# Patient Record
Sex: MALE | Race: WHITE | NOT HISPANIC OR LATINO | URBAN - NONMETROPOLITAN AREA
[De-identification: names, ages, dates, MRNs, and addresses within clinical notes are randomized per-mention and may not be internally consistent; named-entity substitution may affect disease eponyms.]

---

## 2019-07-11 ENCOUNTER — IMPORTED ENCOUNTER (OUTPATIENT)
Dept: URBAN - NONMETROPOLITAN AREA CLINIC 1 | Facility: CLINIC | Age: 64
End: 2019-07-11

## 2019-07-11 PROBLEM — E11.9: Noted: 2019-07-11

## 2019-07-11 PROBLEM — E11.3291: Noted: 2019-07-11

## 2019-07-11 PROBLEM — H49.21: Noted: 2019-07-11

## 2019-07-11 PROBLEM — H25.13: Noted: 2019-07-11

## 2019-07-11 PROCEDURE — 99203 OFFICE O/P NEW LOW 30 MIN: CPT

## 2019-07-11 NOTE — PATIENT DISCUSSION
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 - Discussed diagnosis in detail with patient - Onset about 1 week ago with double vision- Patient has had a CT scan with normal findings- Dilated exam shows OD  cotton wool spot with splinter heme and dot blots  S/T arcade and OS shows no diabetic retinopathy - Would like to get blood sugar and blood pressure under control - Continue to monitor IDDM (1999) with NPDR OD - Discussed diagnosis in detail with patient- Stressed importance of good blood sugar control- Recommend no soda’s- Patient reports last A1C was 7.2 and last blood sugar was 201- Dilated exam today shows OD cotton wool spot with splinter heme and dot blots  S/T arcade and OS shows no diabetic retinopathy - Would like to rule out BRVO ? - Letter to Dr. Kaycee [place] [address] [address] [address] at next visit ***only if patient does not have double vision complaint - Continue to [facility]- Discussed diagnosis in detail with patient- Discussed signs and symptoms of progression- Discussed UV protection- No treatment needed at this time - Continue to monitor

## 2019-08-09 ENCOUNTER — IMPORTED ENCOUNTER (OUTPATIENT)
Dept: URBAN - NONMETROPOLITAN AREA CLINIC 1 | Facility: CLINIC | Age: 64
End: 2019-08-09

## 2019-08-09 PROCEDURE — 92250 FUNDUS PHOTOGRAPHY W/I&R: CPT

## 2019-08-09 PROCEDURE — 99213 OFFICE O/P EST LOW 20 MIN: CPT

## 2019-08-09 NOTE — PATIENT DISCUSSION
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 when he was younger- Patient has had a CT scan with normal findings since this was normal I feel as though patient does not need to see Neuro-Ophthalmologist at this time. Patient agrees with plan and states he doesn't want referral at this time. - Improved adduction OD slight diplopia still noted with central/inferior gaze. Diplopia increases with right gaze. Discussed with patient that it could take 4-6 months to resolve. - Dilated exam at last exam: OD  cotton wool spot with splinter heme and dot blots  S/T arcade and OS shows no diabetic retinopathy - Optos today: cotton wool spot resolved small resovling heme in superior/temporal arcade. Improved from previous.  - Would like to get blood sugar and blood pressure under control - Patient has follow up with Dr. Tramaine Pillai at the end of this month will send updated letter- Continue to monitor IDDM (1999) with NPDR OD - improving OD- Discussed diagnosis in detail with patient- Stressed importance of good blood sugar control- Recommend no soda’s- Patient reports last A1C was 7.2 and last blood sugar was 201- Letter to Dr. Tramaine Pillai in [address] to [facility]- Discussed diagnosis in detail with patient- Discussed signs and symptoms of progression- Discussed UV protection- No treatment needed at this time - Continue to monitor

## 2019-12-10 ENCOUNTER — IMPORTED ENCOUNTER (OUTPATIENT)
Dept: URBAN - NONMETROPOLITAN AREA CLINIC 1 | Facility: CLINIC | Age: 64
End: 2019-12-10

## 2019-12-10 PROCEDURE — 99213 OFFICE O/P EST LOW 20 MIN: CPT

## 2019-12-10 PROCEDURE — 92250 FUNDUS PHOTOGRAPHY W/I&R: CPT

## 2019-12-10 NOTE — PATIENT DISCUSSION
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 when he was younger- Patient has had a CT scan with normal findings since this was normal I feel as though patient does not need to see Neuro-Ophthalmologist at this time. Patient agrees with plan and states he doesn't want referral at this time. - Dilated exam at last exam: OD  cotton wool spot with splinter heme and dot blots  S/T arcade has resolved on todays exam and OS shows no diabetic retinopathy - Optos today: cotton wool spot resolved small resovling heme in superior/temporal arcade.  Resolved - Would like to get blood sugar and blood pressure under control - Patient has follow up with Dr. Tramaine Pillai at the end of this month will send updated letter- Continue to monitor IDDM (1999) with NPDR OD - improving OD- Discussed diagnosis in detail with patient- Stressed importance of good blood sugar control- Recommend no soda’s- Patient reports last A1C was 7.2 and last blood sugar was 201- Letter to Dr. Tramaine Pillai in [address] to [facility]- Discussed diagnosis in detail with patient- Discussed signs and symptoms of progression- Discussed UV protection- No treatment needed at this time - Continue to monitor; Dr's Notes: Optos 12/10/19

## 2022-04-09 ASSESSMENT — TONOMETRY
OD_IOP_MMHG: 12
OD_IOP_MMHG: 16
OS_IOP_MMHG: 16
OS_IOP_MMHG: 16
OD_IOP_MMHG: 16
OS_IOP_MMHG: 13

## 2022-04-09 ASSESSMENT — VISUAL ACUITY
OD_CC: 20/20-1
OD_CC: 20/25-2
OD_CC: 20/20-
OS_CC: 20/25-1
OS_CC: 20/25-
OS_CC: 20/22

## 2022-09-29 ENCOUNTER — ESTABLISHED PATIENT (OUTPATIENT)
Dept: URBAN - NONMETROPOLITAN AREA CLINIC 1 | Facility: CLINIC | Age: 67
End: 2022-09-29

## 2022-09-29 DIAGNOSIS — H52.4: ICD-10-CM

## 2022-09-29 DIAGNOSIS — H49.21: ICD-10-CM

## 2022-09-29 PROCEDURE — 92015 DETERMINE REFRACTIVE STATE: CPT

## 2022-09-29 PROCEDURE — 92014 COMPRE OPH EXAM EST PT 1/>: CPT

## 2022-09-29 ASSESSMENT — VISUAL ACUITY
OS_SC: 20/20
OD_SC: 20/20

## 2022-09-29 ASSESSMENT — TONOMETRY
OS_IOP_MMHG: 16
OD_IOP_MMHG: 16

## 2022-09-29 NOTE — PATIENT DISCUSSION
Patient has history of intermittent double vision. However, today he states he was sick recently with high fevers and has noticed that since that time his double vision has come back and seems to be worse than before.

## 2022-09-29 NOTE — PATIENT DISCUSSION
Retinal exam findings communicated to Physician managing diabetes: Raymond Joya @ [address] [facility] in [place], [place].

## 2022-09-29 NOTE — PATIENT DISCUSSION
Patient defers referral to neuro-ophthalmologist at this time. If no improvement noted over the next 2-3 months, we need to reconsider this referral. Patient expressed understanding.

## 2022-09-29 NOTE — PATIENT DISCUSSION
Unknown if this is related to his blood sugar or maybe blood pressure, asked him to discuss further with PCP.

## 2022-12-06 ENCOUNTER — FOLLOW UP (OUTPATIENT)
Dept: URBAN - NONMETROPOLITAN AREA CLINIC 1 | Facility: CLINIC | Age: 67
End: 2022-12-06

## 2022-12-06 PROCEDURE — 99213 OFFICE O/P EST LOW 20 MIN: CPT

## 2022-12-06 ASSESSMENT — TONOMETRY
OD_IOP_MMHG: 16
OS_IOP_MMHG: 16

## 2022-12-06 ASSESSMENT — VISUAL ACUITY
OD_SC: 20/25-2
OS_SC: 20/30-1

## 2022-12-06 NOTE — PATIENT DISCUSSION
Retinal exam findings communicated to Physician managing diabetes:  Mandi Eddy MD @ [email or web address] [facility] in [place], [place].

## 2022-12-06 NOTE — PATIENT DISCUSSION
Patient has history of intermittent double vision. However, at his last visit he stated he had recently been sick with high fevers and had noticed that since that time his double vision had come back and seemed to be worse than before. After thorough examination at that time I noted decreased abduction OD, diplopic on central and right gaze. Unknown if this was related to his blood sugar or maybe blood pressure, asked him to discuss further with PCP if changed/worsened. Patient deferred referral to neuro-ophthalmologist at that time. If no improvement noted over the next 2-3 months, we need to reconsider this referral. Patient expressed understanding.

## 2024-03-19 ENCOUNTER — HOSPITAL ENCOUNTER (EMERGENCY)
Facility: HOSPITAL | Age: 69
Discharge: OTHER INSTITUTION | End: 2024-03-19
Payer: MEDICARE

## 2024-03-19 ENCOUNTER — ANESTHESIA (OUTPATIENT)
Dept: EMERGENCY MEDICINE | Facility: HOSPITAL | Age: 69
End: 2024-03-19
Payer: MEDICARE

## 2024-03-19 ENCOUNTER — ANESTHESIA EVENT (OUTPATIENT)
Dept: EMERGENCY MEDICINE | Facility: HOSPITAL | Age: 69
End: 2024-03-19
Payer: MEDICARE

## 2024-03-19 ENCOUNTER — HOSPITAL ENCOUNTER (EMERGENCY)
Facility: HOSPITAL | Age: 69
End: 2024-03-19
Payer: MEDICARE

## 2024-03-19 ENCOUNTER — APPOINTMENT (OUTPATIENT)
Dept: CARDIOLOGY | Facility: HOSPITAL | Age: 69
End: 2024-03-19
Payer: MEDICARE

## 2024-03-19 ENCOUNTER — APPOINTMENT (OUTPATIENT)
Dept: RADIOLOGY | Facility: HOSPITAL | Age: 69
End: 2024-03-19
Payer: MEDICARE

## 2024-03-19 VITALS
SYSTOLIC BLOOD PRESSURE: 177 MMHG | HEIGHT: 72 IN | HEART RATE: 99 BPM | OXYGEN SATURATION: 93 % | RESPIRATION RATE: 16 BRPM | DIASTOLIC BLOOD PRESSURE: 104 MMHG | BODY MASS INDEX: 32.49 KG/M2 | WEIGHT: 239.86 LBS

## 2024-03-19 VITALS — RESPIRATION RATE: 16 BRPM | HEART RATE: 100 BPM | OXYGEN SATURATION: 72 %

## 2024-03-19 DIAGNOSIS — R57.0 CARDIOGENIC SHOCK (MULTI): ICD-10-CM

## 2024-03-19 DIAGNOSIS — I21.3 ST ELEVATION MYOCARDIAL INFARCTION (STEMI), UNSPECIFIED ARTERY (MULTI): ICD-10-CM

## 2024-03-19 DIAGNOSIS — I21.3 STEMI (ST ELEVATION MYOCARDIAL INFARCTION) (MULTI): ICD-10-CM

## 2024-03-19 DIAGNOSIS — I46.9 CARDIAC ARREST (MULTI): Primary | ICD-10-CM

## 2024-03-19 DIAGNOSIS — I21.02 ST ELEVATION (STEMI) MYOCARDIAL INFARCTION INVOLVING LEFT ANTERIOR DESCENDING CORONARY ARTERY (MULTI): ICD-10-CM

## 2024-03-19 LAB
ALBUMIN SERPL BCP-MCNC: 4.5 G/DL (ref 3.4–5)
ALP SERPL-CCNC: 122 U/L (ref 33–136)
ALT SERPL W P-5'-P-CCNC: 28 U/L (ref 10–52)
ANION GAP BLDA CALCULATED.4IONS-SCNC: 17 MMO/L (ref 10–25)
ANION GAP BLDA CALCULATED.4IONS-SCNC: 19 MMO/L (ref 10–25)
ANION GAP BLDA CALCULATED.4IONS-SCNC: 23 MMO/L (ref 10–25)
ANION GAP SERPL CALC-SCNC: 18 MMOL/L (ref 10–20)
APPARATUS: ABNORMAL
ARTERIAL PATENCY WRIST A: POSITIVE
AST SERPL W P-5'-P-CCNC: 30 U/L (ref 9–39)
ATRIAL RATE: 116 BPM
BASE EXCESS BLDA CALC-SCNC: -10.4 MMOL/L (ref -2–3)
BASE EXCESS BLDA CALC-SCNC: -13.6 MMOL/L (ref -2–3)
BASE EXCESS BLDA CALC-SCNC: -15.1 MMOL/L (ref -2–3)
BASOPHILS # BLD AUTO: 0.25 X10*3/UL (ref 0–0.1)
BASOPHILS NFR BLD AUTO: 1 %
BILIRUB SERPL-MCNC: 0.4 MG/DL (ref 0–1.2)
BODY TEMPERATURE: ABNORMAL
BUN SERPL-MCNC: 24 MG/DL (ref 6–23)
CA-I BLDA-SCNC: 0.86 MMOL/L (ref 1.1–1.33)
CA-I BLDA-SCNC: 1.18 MMOL/L (ref 1.1–1.33)
CA-I BLDA-SCNC: 1.3 MMOL/L (ref 1.1–1.33)
CALCIUM SERPL-MCNC: 10.3 MG/DL (ref 8.6–10.3)
CARDIAC TROPONIN I PNL SERPL HS: 2169 NG/L (ref 0–20)
CHLORIDE BLDA-SCNC: 101 MMOL/L (ref 98–107)
CHLORIDE BLDA-SCNC: 81 MMOL/L (ref 98–107)
CHLORIDE BLDA-SCNC: 94 MMOL/L (ref 98–107)
CHLORIDE SERPL-SCNC: 99 MMOL/L (ref 98–107)
CO2 SERPL-SCNC: 22 MMOL/L (ref 21–32)
CREAT SERPL-MCNC: 1.57 MG/DL (ref 0.5–1.3)
EGFRCR SERPLBLD CKD-EPI 2021: 48 ML/MIN/1.73M*2
EOSINOPHIL # BLD AUTO: 0.45 X10*3/UL (ref 0–0.7)
EOSINOPHIL NFR BLD AUTO: 1.8 %
ERYTHROCYTE [DISTWIDTH] IN BLOOD BY AUTOMATED COUNT: 14.5 % (ref 11.5–14.5)
GLUCOSE BLD MANUAL STRIP-MCNC: 256 MG/DL (ref 74–99)
GLUCOSE BLD MANUAL STRIP-MCNC: 266 MG/DL (ref 74–99)
GLUCOSE BLDA-MCNC: 276 MG/DL (ref 74–99)
GLUCOSE BLDA-MCNC: 423 MG/DL (ref 74–99)
GLUCOSE BLDA-MCNC: 659 MG/DL (ref 74–99)
GLUCOSE SERPL-MCNC: 253 MG/DL (ref 74–99)
HCO3 BLDA-SCNC: 18.2 MMOL/L (ref 22–26)
HCO3 BLDA-SCNC: 18.5 MMOL/L (ref 22–26)
HCO3 BLDA-SCNC: 22.4 MMOL/L (ref 22–26)
HCT VFR BLD AUTO: 47.1 % (ref 41–52)
HCT VFR BLD EST: 34 % (ref 41–52)
HCT VFR BLD EST: 42 % (ref 41–52)
HCT VFR BLD EST: 43 % (ref 41–52)
HGB BLD-MCNC: 14.6 G/DL (ref 13.5–17.5)
HGB BLDA-MCNC: 11.2 G/DL (ref 13.5–17.5)
HGB BLDA-MCNC: 14.1 G/DL (ref 13.5–17.5)
HGB BLDA-MCNC: 14.3 G/DL (ref 13.5–17.5)
HOLD SPECIMEN: NORMAL
IMM GRANULOCYTES # BLD AUTO: 0.37 X10*3/UL (ref 0–0.7)
IMM GRANULOCYTES NFR BLD AUTO: 1.5 % (ref 0–0.9)
INHALED O2 CONCENTRATION: 100 %
LACTATE BLDA-SCNC: 6.6 MMOL/L (ref 0.4–2)
LACTATE BLDA-SCNC: 7.7 MMOL/L (ref 0.4–2)
LACTATE BLDA-SCNC: 7.9 MMOL/L (ref 0.4–2)
LYMPHOCYTES # BLD AUTO: 6.33 X10*3/UL (ref 1.2–4.8)
LYMPHOCYTES NFR BLD AUTO: 24.9 %
MCH RBC QN AUTO: 27.8 PG (ref 26–34)
MCHC RBC AUTO-ENTMCNC: 31 G/DL (ref 32–36)
MCV RBC AUTO: 90 FL (ref 80–100)
MONOCYTES # BLD AUTO: 2.78 X10*3/UL (ref 0.1–1)
MONOCYTES NFR BLD AUTO: 11 %
NEUTROPHILS # BLD AUTO: 15.2 X10*3/UL (ref 1.2–7.7)
NEUTROPHILS NFR BLD AUTO: 59.8 %
NRBC BLD-RTO: 0.2 /100 WBCS (ref 0–0)
OXYHGB MFR BLDA: 71.4 % (ref 94–98)
OXYHGB MFR BLDA: 83.4 % (ref 94–98)
OXYHGB MFR BLDA: 90.6 % (ref 94–98)
P AXIS: 18 DEGREES
P OFFSET: 187 MS
P ONSET: 127 MS
PCO2 BLDA: 69 MM HG (ref 38–42)
PCO2 BLDA: 84 MM HG (ref 38–42)
PCO2 BLDA: 95 MM HG (ref 38–42)
PH BLDA: 6.95 PH (ref 7.38–7.42)
PH BLDA: 6.98 PH (ref 7.38–7.42)
PH BLDA: 7.03 PH (ref 7.38–7.42)
PLATELET # BLD AUTO: 434 X10*3/UL (ref 150–450)
PO2 BLDA: 55 MM HG (ref 85–95)
PO2 BLDA: 60 MM HG (ref 85–95)
PO2 BLDA: 84 MM HG (ref 85–95)
POTASSIUM BLDA-SCNC: 3.7 MMOL/L (ref 3.5–5.3)
POTASSIUM BLDA-SCNC: 4.4 MMOL/L (ref 3.5–5.3)
POTASSIUM BLDA-SCNC: 6.2 MMOL/L (ref 3.5–5.3)
POTASSIUM SERPL-SCNC: 5.2 MMOL/L (ref 3.5–5.3)
PR INTERVAL: 170 MS
PROT SERPL-MCNC: 7.7 G/DL (ref 6.4–8.2)
Q ONSET: 212 MS
QRS COUNT: 19 BEATS
QRS DURATION: 144 MS
QT INTERVAL: 338 MS
QTC CALCULATION(BAZETT): 469 MS
QTC FREDERICIA: 421 MS
R AXIS: -6 DEGREES
RBC # BLD AUTO: 5.25 X10*6/UL (ref 4.5–5.9)
SAO2 % BLDA: 73 % (ref 94–100)
SAO2 % BLDA: 85 % (ref 94–100)
SAO2 % BLDA: 93 % (ref 94–100)
SODIUM BLDA-SCNC: 119 MMOL/L (ref 136–145)
SODIUM BLDA-SCNC: 130 MMOL/L (ref 136–145)
SODIUM BLDA-SCNC: 131 MMOL/L (ref 136–145)
SODIUM SERPL-SCNC: 134 MMOL/L (ref 136–145)
SPECIMEN DRAWN FROM PATIENT: ABNORMAL
T AXIS: 15 DEGREES
T OFFSET: 381 MS
VENTRICULAR RATE: 116 BPM
WBC # BLD AUTO: 25.4 X10*3/UL (ref 4.4–11.3)

## 2024-03-19 PROCEDURE — 4A023N8 MEASUREMENT OF CARDIAC SAMPLING AND PRESSURE, BILATERAL, PERCUTANEOUS APPROACH: ICD-10-PCS | Performed by: INTERNAL MEDICINE

## 2024-03-19 PROCEDURE — 99153 MOD SED SAME PHYS/QHP EA: CPT | Performed by: INTERNAL MEDICINE

## 2024-03-19 PROCEDURE — 94681 O2 UPTK CO2 OUTP % O2 XTRC: CPT | Mod: 59,MUE

## 2024-03-19 PROCEDURE — C1874 STENT, COATED/COV W/DEL SYS: HCPCS | Performed by: INTERNAL MEDICINE

## 2024-03-19 PROCEDURE — 36600 WITHDRAWAL OF ARTERIAL BLOOD: CPT | Mod: 59

## 2024-03-19 PROCEDURE — 80053 COMPREHEN METABOLIC PANEL: CPT | Performed by: EMERGENCY MEDICINE

## 2024-03-19 PROCEDURE — 2500000004 HC RX 250 GENERAL PHARMACY W/ HCPCS (ALT 636 FOR OP/ED)

## 2024-03-19 PROCEDURE — C1753 CATH, INTRAVAS ULTRASOUND: HCPCS | Performed by: INTERNAL MEDICINE

## 2024-03-19 PROCEDURE — 92972 PERQ TRLUML CORONRY LITHOTRP: CPT | Performed by: INTERNAL MEDICINE

## 2024-03-19 PROCEDURE — 93005 ELECTROCARDIOGRAM TRACING: CPT

## 2024-03-19 PROCEDURE — 99152 MOD SED SAME PHYS/QHP 5/>YRS: CPT | Performed by: INTERNAL MEDICINE

## 2024-03-19 PROCEDURE — 2500000005 HC RX 250 GENERAL PHARMACY W/O HCPCS

## 2024-03-19 PROCEDURE — 92941 PRQ TRLML REVSC TOT OCCL AMI: CPT | Performed by: INTERNAL MEDICINE

## 2024-03-19 PROCEDURE — 71045 X-RAY EXAM CHEST 1 VIEW: CPT | Mod: FOREIGN READ | Performed by: RADIOLOGY

## 2024-03-19 PROCEDURE — B211YZZ FLUOROSCOPY OF MULTIPLE CORONARY ARTERIES USING OTHER CONTRAST: ICD-10-PCS | Performed by: INTERNAL MEDICINE

## 2024-03-19 PROCEDURE — 93005 ELECTROCARDIOGRAM TRACING: CPT | Mod: 59

## 2024-03-19 PROCEDURE — 99223 1ST HOSP IP/OBS HIGH 75: CPT | Performed by: INTERNAL MEDICINE

## 2024-03-19 PROCEDURE — 92950 HEART/LUNG RESUSCITATION CPR: CPT

## 2024-03-19 PROCEDURE — C1894 INTRO/SHEATH, NON-LASER: HCPCS | Performed by: INTERNAL MEDICINE

## 2024-03-19 PROCEDURE — 2500000004 HC RX 250 GENERAL PHARMACY W/ HCPCS (ALT 636 FOR OP/ED): Performed by: EMERGENCY MEDICINE

## 2024-03-19 PROCEDURE — C1769 GUIDE WIRE: HCPCS | Performed by: INTERNAL MEDICINE

## 2024-03-19 PROCEDURE — 82947 ASSAY GLUCOSE BLOOD QUANT: CPT | Mod: 59

## 2024-03-19 PROCEDURE — 51702 INSERT TEMP BLADDER CATH: CPT

## 2024-03-19 PROCEDURE — 2500000005 HC RX 250 GENERAL PHARMACY W/O HCPCS: Performed by: EMERGENCY MEDICINE

## 2024-03-19 PROCEDURE — C1725 CATH, TRANSLUMIN NON-LASER: HCPCS | Mod: MUE | Performed by: INTERNAL MEDICINE

## 2024-03-19 PROCEDURE — 027034Z DILATION OF CORONARY ARTERY, ONE ARTERY WITH DRUG-ELUTING INTRALUMINAL DEVICE, PERCUTANEOUS APPROACH: ICD-10-PCS | Performed by: INTERNAL MEDICINE

## 2024-03-19 PROCEDURE — 33990 INSJ PERQ VAD L HRT ARTERIAL: CPT | Performed by: INTERNAL MEDICINE

## 2024-03-19 PROCEDURE — 5A1221J PERFORMANCE OF CARDIAC OUTPUT, CONTINUOUS, AUTOMATED: ICD-10-PCS | Performed by: INTERNAL MEDICINE

## 2024-03-19 PROCEDURE — 76942 ECHO GUIDE FOR BIOPSY: CPT | Performed by: INTERNAL MEDICINE

## 2024-03-19 PROCEDURE — 85347 COAGULATION TIME ACTIVATED: CPT | Performed by: INTERNAL MEDICINE

## 2024-03-19 PROCEDURE — 31500 INSERT EMERGENCY AIRWAY: CPT

## 2024-03-19 PROCEDURE — 36600 WITHDRAWAL OF ARTERIAL BLOOD: CPT

## 2024-03-19 PROCEDURE — 85025 COMPLETE CBC W/AUTO DIFF WBC: CPT | Performed by: EMERGENCY MEDICINE

## 2024-03-19 PROCEDURE — 31720 CLEARANCE OF AIRWAYS: CPT | Mod: MUE

## 2024-03-19 PROCEDURE — 5A0221D ASSISTANCE WITH CARDIAC OUTPUT USING IMPELLER PUMP, CONTINUOUS: ICD-10-PCS | Performed by: INTERNAL MEDICINE

## 2024-03-19 PROCEDURE — 94760 N-INVAS EAR/PLS OXIMETRY 1: CPT | Mod: CCI

## 2024-03-19 PROCEDURE — 02HA3RJ INSERTION OF SHORT-TERM EXTERNAL HEART ASSIST SYSTEM INTO HEART, INTRAOPERATIVE, PERCUTANEOUS APPROACH: ICD-10-PCS | Performed by: INTERNAL MEDICINE

## 2024-03-19 PROCEDURE — 36415 COLL VENOUS BLD VENIPUNCTURE: CPT | Performed by: EMERGENCY MEDICINE

## 2024-03-19 PROCEDURE — 94002 VENT MGMT INPAT INIT DAY: CPT | Mod: 59

## 2024-03-19 PROCEDURE — 84484 ASSAY OF TROPONIN QUANT: CPT | Performed by: EMERGENCY MEDICINE

## 2024-03-19 PROCEDURE — 2720000007 HC OR 272 NO HCPCS: Performed by: INTERNAL MEDICINE

## 2024-03-19 PROCEDURE — 33990 INSJ PERQ VAD L HRT ARTERIAL: CPT | Mod: CA | Performed by: INTERNAL MEDICINE

## 2024-03-19 PROCEDURE — 84132 ASSAY OF SERUM POTASSIUM: CPT | Performed by: EMERGENCY MEDICINE

## 2024-03-19 PROCEDURE — C1889 IMPLANT/INSERT DEVICE, NOC: HCPCS | Performed by: INTERNAL MEDICINE

## 2024-03-19 PROCEDURE — 99291 CRITICAL CARE FIRST HOUR: CPT | Mod: 25 | Performed by: EMERGENCY MEDICINE

## 2024-03-19 PROCEDURE — 2780000003 HC OR 278 NO HCPCS: Performed by: INTERNAL MEDICINE

## 2024-03-19 PROCEDURE — 5A12012 PERFORMANCE OF CARDIAC OUTPUT, SINGLE, MANUAL: ICD-10-PCS | Performed by: INTERNAL MEDICINE

## 2024-03-19 PROCEDURE — 93451 RIGHT HEART CATH: CPT | Mod: CCI | Performed by: INTERNAL MEDICINE

## 2024-03-19 PROCEDURE — 1210000001 HC SEMI-PRIVATE ROOM DAILY

## 2024-03-19 PROCEDURE — 2550000001 HC RX 255 CONTRASTS: Performed by: INTERNAL MEDICINE

## 2024-03-19 PROCEDURE — 93454 CORONARY ARTERY ANGIO S&I: CPT | Mod: 59 | Performed by: INTERNAL MEDICINE

## 2024-03-19 PROCEDURE — 2500000005 HC RX 250 GENERAL PHARMACY W/O HCPCS: Performed by: NURSE ANESTHETIST, CERTIFIED REGISTERED

## 2024-03-19 PROCEDURE — 82435 ASSAY OF BLOOD CHLORIDE: CPT

## 2024-03-19 PROCEDURE — 2500000004 HC RX 250 GENERAL PHARMACY W/ HCPCS (ALT 636 FOR OP/ED): Performed by: INTERNAL MEDICINE

## 2024-03-19 PROCEDURE — 31720 CLEARANCE OF AIRWAYS: CPT

## 2024-03-19 PROCEDURE — 2500000001 HC RX 250 WO HCPCS SELF ADMINISTERED DRUGS (ALT 637 FOR MEDICARE OP)

## 2024-03-19 PROCEDURE — B215YZZ FLUOROSCOPY OF LEFT HEART USING OTHER CONTRAST: ICD-10-PCS | Performed by: INTERNAL MEDICINE

## 2024-03-19 PROCEDURE — 92978 ENDOLUMINL IVUS OCT C 1ST: CPT | Performed by: INTERNAL MEDICINE

## 2024-03-19 PROCEDURE — 71045 X-RAY EXAM CHEST 1 VIEW: CPT

## 2024-03-19 PROCEDURE — 2500000002 HC RX 250 W HCPCS SELF ADMINISTERED DRUGS (ALT 637 FOR MEDICARE OP, ALT 636 FOR OP/ED)

## 2024-03-19 PROCEDURE — C1751 CATH, INF, PER/CENT/MIDLINE: HCPCS | Performed by: INTERNAL MEDICINE

## 2024-03-19 PROCEDURE — 84132 ASSAY OF SERUM POTASSIUM: CPT

## 2024-03-19 PROCEDURE — C9606 PERC D-E COR REVASC W AMI S: HCPCS | Performed by: INTERNAL MEDICINE

## 2024-03-19 PROCEDURE — C1887 CATHETER, GUIDING: HCPCS | Performed by: INTERNAL MEDICINE

## 2024-03-19 PROCEDURE — 93456 R HRT CORONARY ARTERY ANGIO: CPT | Performed by: INTERNAL MEDICINE

## 2024-03-19 PROCEDURE — 2500000005 HC RX 250 GENERAL PHARMACY W/O HCPCS: Performed by: INTERNAL MEDICINE

## 2024-03-19 PROCEDURE — 92950 HEART/LUNG RESUSCITATION CPR: CPT | Performed by: INTERNAL MEDICINE

## 2024-03-19 PROCEDURE — 94002 VENT MGMT INPAT INIT DAY: CPT

## 2024-03-19 DEVICE — IMPELLA CP PUMP 371 SET, US ( IMPELLA CP WITH SMART ASSIST)
Type: IMPLANTABLE DEVICE | Site: HEART | Status: FUNCTIONAL
Brand: IMPELLA

## 2024-03-19 DEVICE — EVEROLIMUS-ELUTING PLATINUM CHROMIUM CORONARY STENT SYSTEM
Type: IMPLANTABLE DEVICE | Site: HEART | Status: FUNCTIONAL
Brand: SYNERGY™ XD

## 2024-03-19 RX ORDER — POLYETHYLENE GLYCOL 3350 17 G/17G
17 POWDER, FOR SOLUTION ORAL DAILY
Status: DISCONTINUED | OUTPATIENT
Start: 2024-03-19 | End: 2024-03-19 | Stop reason: HOSPADM

## 2024-03-19 RX ORDER — ATROPINE SULFATE 0.1 MG/ML
INJECTION INTRAVENOUS AS NEEDED
Status: DISCONTINUED | OUTPATIENT
Start: 2024-03-19 | End: 2024-03-19 | Stop reason: HOSPADM

## 2024-03-19 RX ORDER — PROPOFOL 10 MG/ML
5-50 INJECTION, EMULSION INTRAVENOUS CONTINUOUS
Status: DISCONTINUED | OUTPATIENT
Start: 2024-03-19 | End: 2024-03-19 | Stop reason: HOSPADM

## 2024-03-19 RX ORDER — HEPARIN SODIUM 5000 [USP'U]/ML
INJECTION, SOLUTION INTRAVENOUS; SUBCUTANEOUS
Status: COMPLETED
Start: 2024-03-19 | End: 2024-03-19

## 2024-03-19 RX ORDER — ASPIRIN 300 MG/1
SUPPOSITORY RECTAL
Status: DISCONTINUED
Start: 2024-03-19 | End: 2024-03-19 | Stop reason: WASHOUT

## 2024-03-19 RX ORDER — ROCURONIUM BROMIDE 10 MG/ML
80 INJECTION, SOLUTION INTRAVENOUS ONCE
Status: COMPLETED | OUTPATIENT
Start: 2024-03-19 | End: 2024-03-19

## 2024-03-19 RX ORDER — EPINEPHRINE 1 MG/ML
INJECTION, SOLUTION, CONCENTRATE INTRAVENOUS AS NEEDED
Status: DISCONTINUED | OUTPATIENT
Start: 2024-03-19 | End: 2024-03-19 | Stop reason: HOSPADM

## 2024-03-19 RX ORDER — LIDOCAINE HYDROCHLORIDE 20 MG/ML
INJECTION, SOLUTION INFILTRATION; PERINEURAL AS NEEDED
Status: DISCONTINUED | OUTPATIENT
Start: 2024-03-19 | End: 2024-03-19 | Stop reason: HOSPADM

## 2024-03-19 RX ORDER — HEPARIN SODIUM 5000 [USP'U]/ML
4000 INJECTION, SOLUTION INTRAVENOUS; SUBCUTANEOUS ONCE
Status: COMPLETED | OUTPATIENT
Start: 2024-03-19 | End: 2024-03-19

## 2024-03-19 RX ORDER — AMIODARONE HYDROCHLORIDE 150 MG/3ML
INJECTION, SOLUTION INTRAVENOUS AS NEEDED
Status: DISCONTINUED | OUTPATIENT
Start: 2024-03-19 | End: 2024-03-19 | Stop reason: HOSPADM

## 2024-03-19 RX ORDER — NAPROXEN SODIUM 220 MG/1
TABLET, FILM COATED ORAL
Status: COMPLETED
Start: 2024-03-19 | End: 2024-03-19

## 2024-03-19 RX ORDER — ETOMIDATE 2 MG/ML
INJECTION INTRAVENOUS CODE/TRAUMA/SEDATION MEDICATION
Status: COMPLETED | OUTPATIENT
Start: 2024-03-19 | End: 2024-03-19

## 2024-03-19 RX ORDER — CLOPIDOGREL BISULFATE 300 MG/1
300 TABLET, FILM COATED ORAL ONCE
Status: DISCONTINUED | OUTPATIENT
Start: 2024-03-19 | End: 2024-03-19 | Stop reason: HOSPADM

## 2024-03-19 RX ORDER — DEXTROSE 50 % IN WATER (D50W) INTRAVENOUS SYRINGE
CODE/TRAUMA/SEDATION MEDICATION
Status: COMPLETED | OUTPATIENT
Start: 2024-03-19 | End: 2024-03-19

## 2024-03-19 RX ORDER — NAPROXEN SODIUM 220 MG/1
324 TABLET, FILM COATED ORAL ONCE
Status: COMPLETED | OUTPATIENT
Start: 2024-03-19 | End: 2024-03-19

## 2024-03-19 RX ORDER — SODIUM CHLORIDE 9 MG/ML
INJECTION, SOLUTION INTRAVENOUS
Status: COMPLETED | OUTPATIENT
Start: 2024-03-19 | End: 2024-03-19

## 2024-03-19 RX ORDER — NOREPINEPHRINE BITARTRATE/D5W 8 MG/250ML
PLASTIC BAG, INJECTION (ML) INTRAVENOUS CONTINUOUS PRN
Status: DISCONTINUED | OUTPATIENT
Start: 2024-03-19 | End: 2024-03-19 | Stop reason: HOSPADM

## 2024-03-19 RX ORDER — HEPARIN SODIUM 1000 [USP'U]/ML
60 INJECTION, SOLUTION INTRAVENOUS; SUBCUTANEOUS ONCE
Status: DISCONTINUED | OUTPATIENT
Start: 2024-03-19 | End: 2024-03-19 | Stop reason: HOSPADM

## 2024-03-19 RX ORDER — EPINEPHRINE 0.1 MG/ML
INJECTION INTRACARDIAC; INTRAVENOUS CODE/TRAUMA/SEDATION MEDICATION
Status: COMPLETED | OUTPATIENT
Start: 2024-03-19 | End: 2024-03-19

## 2024-03-19 RX ORDER — PROPOFOL 10 MG/ML
INJECTION, EMULSION INTRAVENOUS
Status: DISCONTINUED
Start: 2024-03-19 | End: 2024-03-19 | Stop reason: HOSPADM

## 2024-03-19 RX ORDER — HEPARIN SODIUM 1000 [USP'U]/ML
INJECTION, SOLUTION INTRAVENOUS; SUBCUTANEOUS AS NEEDED
Status: DISCONTINUED | OUTPATIENT
Start: 2024-03-19 | End: 2024-03-19 | Stop reason: HOSPADM

## 2024-03-19 RX ORDER — SODIUM BICARBONATE 1 MEQ/ML
SYRINGE (ML) INTRAVENOUS AS NEEDED
Status: DISCONTINUED | OUTPATIENT
Start: 2024-03-19 | End: 2024-03-19 | Stop reason: HOSPADM

## 2024-03-19 RX ORDER — ETOMIDATE 2 MG/ML
INJECTION INTRAVENOUS
Status: DISCONTINUED
Start: 2024-03-19 | End: 2024-03-19 | Stop reason: HOSPADM

## 2024-03-19 RX ORDER — EPINEPHRINE 0.1 MG/ML
INJECTION INTRACARDIAC; INTRAVENOUS AS NEEDED
Status: DISCONTINUED | OUTPATIENT
Start: 2024-03-19 | End: 2024-03-19 | Stop reason: HOSPADM

## 2024-03-19 RX ORDER — ROCURONIUM BROMIDE 10 MG/ML
INJECTION, SOLUTION INTRAVENOUS
Status: COMPLETED
Start: 2024-03-19 | End: 2024-03-19

## 2024-03-19 RX ORDER — MIDAZOLAM HYDROCHLORIDE 1 MG/ML
INJECTION, SOLUTION INTRAMUSCULAR; INTRAVENOUS AS NEEDED
Status: DISCONTINUED | OUTPATIENT
Start: 2024-03-19 | End: 2024-03-19 | Stop reason: HOSPADM

## 2024-03-19 RX ORDER — PROPOFOL 10 MG/ML
INJECTION, EMULSION INTRAVENOUS CONTINUOUS PRN
Status: DISCONTINUED | OUTPATIENT
Start: 2024-03-19 | End: 2024-03-19 | Stop reason: HOSPADM

## 2024-03-19 RX ADMIN — ROCURONIUM BROMIDE 80 MG: 10 INJECTION, SOLUTION INTRAVENOUS at 09:32

## 2024-03-19 RX ADMIN — EPINEPHRINE 1 MG: 0.1 INJECTION INTRACARDIAC; INTRAVENOUS at 09:19

## 2024-03-19 RX ADMIN — HEPARIN SODIUM 4000 UNITS: 5000 INJECTION, SOLUTION INTRAVENOUS; SUBCUTANEOUS at 09:39

## 2024-03-19 RX ADMIN — ETOMIDATE 20 MG: 2 INJECTION INTRAVENOUS at 09:32

## 2024-03-19 RX ADMIN — TICAGRELOR 180 MG: 90 TABLET ORAL at 09:50

## 2024-03-19 RX ADMIN — NAPROXEN SODIUM 324 MG: 220 TABLET, FILM COATED ORAL at 09:50

## 2024-03-19 RX ADMIN — Medication 100 PERCENT: at 10:15

## 2024-03-19 RX ADMIN — SODIUM CHLORIDE 999 ML/HR: 9 INJECTION, SOLUTION INTRAVENOUS at 09:32

## 2024-03-19 RX ADMIN — ASPIRIN 81 MG CHEWABLE TABLET 324 MG: 81 TABLET CHEWABLE at 09:50

## 2024-03-19 RX ADMIN — DEXTROSE MONOHYDRATE 12.5 G: 25 INJECTION, SOLUTION INTRAVENOUS at 09:22

## 2024-03-19 ASSESSMENT — PAIN - FUNCTIONAL ASSESSMENT: PAIN_FUNCTIONAL_ASSESSMENT: UNABLE TO SELF-REPORT

## 2024-03-19 NOTE — Clinical Note
Inflation 1: Pressure = 12 lamar; Duration = 16 sec. Inflation 2: Pressure = 12 lamar; Duration = 16 sec. Inflation 3: Pressure = 12 lamar; Duration = 14 sec.

## 2024-03-19 NOTE — Clinical Note
Prep complete. Patient ready for procedure. Blood obtained at this time and sent to lab for ordered tests. Single view of the left coronary artery obtained using hand injection.

## 2024-03-19 NOTE — Clinical Note
Inflation 1: Pressure = 12 lamar; Duration = 36 sec. Inflation 2: Pressure = 20 lamar; Duration = 32 sec. Inflation 3: Pressure = 20 lamar; Duration = 34 sec.

## 2024-03-19 NOTE — ED NOTES
[facility] Med here to  this patient to take to [facility] Cath Lab.  Report called Vianey PONCE in the cath lab.     Margoth Escobedo RN  03/19/24 1031

## 2024-03-19 NOTE — Clinical Note
Angioplasty of the left anterior descending lesion. Inflation 1: Pressure = 8 lamar; Duration = 10 sec. Inflation 2: Pressure = 8 lamar; Duration = 20 sec. Inflation 3: Pressure = 8 lamar; Duration = 16 sec. Inflation 4: Pressure = 8 lamar; Duration = 16 sec.

## 2024-03-19 NOTE — ED PROVIDER NOTES
HPI   Chief Complaint   Patient presents with    Cardiac Arrest     Pt arrived with CP, went into cardiac arrest on way back to room       HPI                    No data recorded                   Patient History   History reviewed. No pertinent past medical history.  History reviewed. No pertinent surgical history.  No family history on file.  Social History     Tobacco Use    Smoking status: Not on file    Smokeless tobacco: Not on file   Substance Use Topics    Alcohol use: Not on file    Drug use: Not on file       Physical Exam   ED Triage Vitals   Temp Heart Rate Respirations BP   -- 03/19/24 0954 03/19/24 0954 03/19/24 0954    (!) 109 20 (!) 174/109      Pulse Ox Temp src Heart Rate Source Patient Position   03/19/24 0954 -- 03/19/24 0916 --   (!) 90 %  Monitor       BP Location FiO2 (%)     -- 03/19/24 0954      100 %       Physical Exam  Constitutional:       General: He is in acute distress.      Appearance: He is ill-appearing, toxic-appearing and diaphoretic.   HENT:      Head: Normocephalic and atraumatic.      Mouth/Throat:      Mouth: Mucous membranes are moist.      Pharynx: Oropharynx is clear.   Eyes:      Conjunctiva/sclera: Conjunctivae normal.      Pupils: Pupils are equal, round, and reactive to light.   Cardiovascular:      Rate and Rhythm: Normal rate and regular rhythm.      Pulses: Normal pulses.      Heart sounds: Normal heart sounds.   Pulmonary:      Effort: No respiratory distress.      Breath sounds: No wheezing.      Comments: BVM  Abdominal:      General: Bowel sounds are normal.      Palpations: Abdomen is soft.      Tenderness: There is no abdominal tenderness. There is no guarding or rebound.   Musculoskeletal:         General: Normal range of motion.      Cervical back: Normal range of motion.   Skin:     General: Skin is warm.   Neurological:      Comments: Unresponsive         ED Course & MDM   ED Course as of 03/19/24 1107   Tue Mar 19, 2024   0923 EKG performed at 923 ST  elevation V2 V3 with reciprocal changes.  Concern for STEMI activated the STEMI alert EKG was interpreted by me. [KA]   0934 EG performed at 974 sinus tachycardia at a ventricular rate of 116 more pronounced ST elevation especially in V2 V3 V4 with reciprocal changes interpreted by me STEMI [KA]      ED Course User Index  [KA] Milton Dorsey DO         Diagnoses as of 03/19/24 1107   Cardiac arrest (CMS/[facility])   ST elevation myocardial infarction (STEMI), unspecified artery (CMS/HCC)       Medical Decision Making  68-year-old male presents to the ER with chief complaint of chest pain.  Patient came in via private vehicle with his wife.  Patient had increasing chest pain was able to transfer from waiting room chair to wheelchair as he is being wheeled over to the trauma bay patient goes unresponsive and had lost pulse.  CPR was started immediately.  We were able to establish IV patient was given epi.  After the first pulse check patient had ROSC.  EKG had some ST elevation activated the 1 push for STEMI alert.  Discussed with the interventionalists.  Patient has been accepted to [facility] for definitive care.        Procedure  Critical Care    Performed by: Milton Dorsey DO  Authorized by: Milton Dorsey DO    Critical care provider statement:     Critical care time (minutes):  60    Critical care time was exclusive of:  Separately billable procedures and treating other patients    Critical care was necessary to treat or prevent imminent or life-threatening deterioration of the following conditions:  Cardiac failure    Critical care was time spent personally by me on the following activities:  Discussions with consultants, evaluation of patient's response to treatment, examination of patient, obtaining history from patient or surrogate, ordering and review of radiographic studies, re-evaluation of patient's condition and ventilator management    Care discussed with: accepting provider at another  facility         Milton ORELLANA [facility], DO  03/19/24 1109

## 2024-03-19 NOTE — Clinical Note
Angioplasty of the left anterior descending lesion. Inflation 1: Pressure = 4 lamar; Duration = 10 sec. Inflation 2: Pressure = 4 lamar; Duration = 4 sec. Inflation 3: Pressure = 4 lamar; Duration = 10 sec. Inflation 4: Pressure = 4 lamar; Duration = 10 sec. 10 pulses per inflation delivered

## 2024-03-19 NOTE — Clinical Note
Angioplasty of the left anterior descending lesion. Inflation 1: Pressure = 20 lamar; Duration = 30 sec.

## 2024-03-19 NOTE — Clinical Note
Inflation 1: Pressure = 4 lamar; Duration = 10 sec. Inflation 2: Pressure = 4 lamar; Duration = 10 sec. 10 pulses per inflation delivered

## 2024-03-19 NOTE — Clinical Note
Angioplasty of the left anterior descending lesion. Inflation 1: Pressure = 20 lamar; Duration = 30 sec. Inflation 2: Pressure = 20 lamar; Duration = 22 sec. Inflation 3: Pressure = 20 lamar; Duration = 30 sec. Inflation 4: Pressure = 20 lamar; Duration = 30 sec.

## 2024-03-19 NOTE — ED NOTES
Three IV sites were extablished, Pt. Was intubated by the nurse anesthetist and RT bagged patient until he was able to be placed on a ventilator.  An OG was placed as well as a fitzpatrick catheter was inserted.       Margoth Escobedo RN  03/19/24 1382

## 2024-03-19 NOTE — ED NOTES
Pt. Was Medicated by Gerri Grider RN with aspirin, brillinta and Heparin as ordered.  Air med is about 20 minutes out to  this patient.     Margoth Escobedo RN  03/19/24 0166

## 2024-03-19 NOTE — ED NOTES
Pt. Arrived by private vehicle, when I got patient from [facility], he was pale/gray in color, was able to transfer self to the wheelchair but became very diaphoretic.  I was wheeling him back to the Trauma room when he started to drop his head forward, I called for help over the radio, when we got into the trauma room, pt. Became unrespnsive and pulseless, Pt. Lifted into bed by myself and another nurse and CPR was started immediately.  Pt. Was bagged with 100% oxygen, Dr. Bush was already at bedside as well as RT.  A code blue was called and extra staff arrived.  Pt. VS were taken, he was placed on the monitor as well as the defibrillator monitor.       Margoth Escobedo, RN  03/19/24 3075

## 2024-03-19 NOTE — Clinical Note
Angioplasty of the proximal left anterior descending lesion. Inflation 1: Pressure = 14 lamar; Duration = 30 sec. Inflation 2: Pressure = 14 lamar; Duration = 30 sec.

## 2024-03-19 NOTE — Clinical Note
Angioplasty of the left anterior descending lesion. Inflation 1: Pressure = 20 lamar; Duration = 30 sec. Inflation 2: Pressure = 20 lamar; Duration = 30 sec.

## 2024-03-19 NOTE — Clinical Note
Angioplasty of the left anterior descending lesion. Inflation 1: Pressure = 12 lamar; Duration = 10 sec. Inflation 2: Pressure = 12 lamar; Duration = 10 sec. DEVICE TIME, DOOR TO BALLOON

## 2024-03-19 NOTE — ANESTHESIA PROCEDURE NOTES
Airway  Date/Time: 3/19/2024 9:32 AM  Urgency: emergent    Airway not difficult    Staffing  Performed: CRNA   Authorized by: SELENE Howard    Performed by: SELENE Howard  Patient location during procedure: ED    Consent for Airway (if performed for an anesthetic, see related documentation for consents)  Patient identity confirmed: arm band and hospital-assigned identification number  Consent: The procedure was performed in an emergent situation.      Indications and Patient Condition  Indications for airway management: airway protection and cardio/pulmonary arrest  Spontaneous ventilation: present  Sedation level: deep  Preoxygenated: yes  Patient position: sniffing  MILS maintained throughout  Mask difficulty assessment: 2 - vent by mask + OA or adjuvant +/- NMBA  No planned trial extubation    Final Airway Details  Final airway type: endotracheal airway      Successful airway: ETT  Cuffed: yes   Successful intubation technique: video laryngoscopy  Facilitating devices/methods: intubating stylet  Endotracheal tube insertion site: oral  Blade size: #3  ETT size (mm): 8.0  Cormack-Lehane Classification: grade I - full view of glottis  Placement verified by: chest auscultation, capnometry and palpation of cuff   Cuff volume (mL): 10  Measured from: gums  ETT to gums (cm): 23  Number of attempts at approach: 1  Number of other approaches attempted: 0    Additional Comments  Emergent intubation s/p cardiopulmonary arrest, STEMI.  Pt unresponsive, meds given per ER charting.  Easy atraumatic intubation.  See vital signs from the ER code record. Glidescope 3 blade.

## 2024-03-19 NOTE — Clinical Note
Angioplasty of the proximal left anterior descending lesion. Inflation 1: Pressure = 14 lamar; Duration = 18 sec. Inflation 2: Pressure = 14 lamar; Duration = 20 sec. Inflation 3: Pressure = 14 lamar; Duration = 25 sec.

## 2024-03-19 NOTE — Clinical Note
Inflation 1: Pressure = 12 lamar; Duration = 40 sec. Inflation 2: Pressure = 12 lamar; Duration = 12 sec.

## 2024-03-20 LAB
ATRIAL RATE: 114 BPM
P AXIS: 43 DEGREES
P OFFSET: 188 MS
P ONSET: 124 MS
PR INTERVAL: 172 MS
Q ONSET: 210 MS
QRS COUNT: 19 BEATS
QRS DURATION: 122 MS
QT INTERVAL: 330 MS
QTC CALCULATION(BAZETT): 454 MS
QTC FREDERICIA: 408 MS
R AXIS: 27 DEGREES
T AXIS: 13 DEGREES
T OFFSET: 375 MS
VENTRICULAR RATE: 114 BPM

## 2024-04-02 NOTE — H&P
History Of Present Illness  Adrian Sauer is a 68 y.o. male presenting with cardiac arrest secondary to anterolateral ST elevation myocardial infarction.  He is intubated and sedated..     Past Medical History  No past medical history on file.    Surgical History  Past Surgical History:   Procedure Laterality Date    CARDIAC CATHETERIZATION N/A 3/19/2024    Procedure: Left Heart Cath, No LV;  Surgeon: Niranjan Levin MD;  Location: [facility] Cardiac Cath Lab;  Service: Cardiovascular;  Laterality: N/A;    CARDIAC CATHETERIZATION N/A 3/19/2024    Procedure: PCI;  Surgeon: Niranjan Levin MD;  Location: [facility] Cardiac Cath Lab;  Service: Cardiovascular;  Laterality: N/A;    CARDIAC CATHETERIZATION N/A 3/19/2024    Procedure: Right Heart Cath;  Surgeon: Niranjan Levin MD;  Location: [facility] Cardiac Cath Lab;  Service: Cardiovascular;  Laterality: N/A;        Social History  He has no history on file for tobacco use, alcohol use, and drug use.    Family History  No family history on file.     Allergies  Patient has no known allergies.    Review of Systems   Unable to perform ROS: Intubated   All other systems reviewed and are negative.       Physical Exam  Vitals reviewed.   Constitutional:       General: He is in acute distress.      Appearance: He is toxic-appearing.      Comments: Intubated and sedated.   HENT:      Head: Normocephalic and atraumatic.   Cardiovascular:      Rate and Rhythm: Tachycardia present.   Pulmonary:      Breath sounds: Stridor present. Wheezing present.   Abdominal:      General: Bowel sounds are normal.      Palpations: Abdomen is soft.          Last Recorded Vitals  Pulse 100, resp. rate 16, SpO2 (!) 72 %.    Relevant Results             Assessment/Plan   Active Problems:  There are no active Hospital Problems.      1.  Anterolateral ST elevation myocardial infarction    Proceed with primary PCI.       I spent 120 minutes in the professional and overall care of this patient.      Niranjan Levin  MD

## 2024-04-02 NOTE — DISCHARGE SUMMARY
"Discharge Diagnosis  Cardiogenic shock secondary to anterolateral ST elevation myocardial infarction    Issues Requiring Follow-Up      Test Results Pending At Discharge  Pending Labs       No current pending labs.            Hospital Course   Mr. Sauer was brought in by his wife to the emergency department to [facility] with complaint of chest pain. There, had a witnessed cardiac arrest requiring 1 round of cardiopulmonary resuscitation (not on a monitor so unclear whether it was VT/VF) but with subsequent EKG after resuscitation demonstrating an ST elevation myocardial infarction. He was intubated and sedated and subsequently brought to the cardiac catheterization laboratory at [facility]. Labs demonstrated a pH of 7.0 and a lactate of 7.9. The patient was on norepinephrine on arrival to the Cath Lab.     The patient underwent successful revascularization of the left anterior descending artery but subsequently developed PEA cardiac arrest with immediate initiation of high quality cardiopulmonary resuscitation which was transitioned to the Aquiles device.  This was secondary to acute occlusion of the septal segment of his left anterior descending artery which was successfully recanalized.  An Impella CP device was also placed.  However, when I spoke with the patient's wife and daughter, his wife made it very clear that he would not want advanced therapies.  Indeed, she reports that he has been having angina for over a year that is gotten progressively worse.  She reports that he has difficulty even bringing the groceries or walking any significant distances without the development of angina.  It has been lifestyle limiting and she had a suspicion that he was having heart related issues, but he insisted that it was just reflux disease.  Today, had a sense of impending doom and severe angina and en route to the hospital, told his wife that he would not want any advanced therapies and to \"let " "him go\" if he were having a heart attack.  At this point, I did not offer him any more advanced therapies, and he had a repeat episode of pulseless electrical activity cardiac arrest in the setting of refractory cardiogenic shock.  Due to his advanced directives and futility of further action, no further intervention was taken.    The arterial line demonstrated no blood pressure, auscultation of the lungs demonstrated no respiratory effort and no audible breath sounds, no pulses could be palpated.  No pupillary response noted.  He did not respond to painful stimuli.    Time of death 3/19/2024 1514.    Pertinent Physical Exam At Time of Discharge  As above.    Home Medications     Medication List      You have not been prescribed any medications.       Outpatient Follow-Up  No future appointments.    Niranjan Levin MD  "

## (undated) DEVICE — SHEATH, PINNACLE, 10 CM,  6FR INTRODUCER, 6FR DIA, 2.5 CM DIALATOR

## (undated) DEVICE — STENT ONYXNG30038UX ONYX 3.00X38RX
Type: IMPLANTABLE DEVICE | Site: HEART | Status: NON-FUNCTIONAL
Brand: ONYX FRONTIER™

## (undated) DEVICE — Device

## (undated) DEVICE — COVER, PROBE, PULL UP ULTRASOUND KIT, 5 X 48

## (undated) DEVICE — CATHETER, BALLOON, NC EUPHORA NONCOMPLIANT 2.0 X 15 X 142CM

## (undated) DEVICE — GUIDEWIRE, RUN THROUGH WIRE, 300CM

## (undated) DEVICE — PAD, DEFIB, TRANSLUCENT, PHYSIO STYLE CMP

## (undated) DEVICE — NEEDLE, ENTRY, PERCUTANEOUS, 21 G X 2.5 CM

## (undated) DEVICE — CATHETER, ANGIO, IMPULSE, PIGTAIL, 6 FR X 110 CM

## (undated) DEVICE — CATHETER, BALLOON, MONORAIL, NC EMERGE, PTCA, 20MM X 3.00MM

## (undated) DEVICE — WIRE, NITINOL,.018 X 40CM, PLATINUM COIL

## (undated) DEVICE — CATHETER, BALLOON, NC EUPHORA NONCOMPLIANT 4.0 X 12 X 142CM

## (undated) DEVICE — CATHETER, TWIN-PASS, DUAL ACCESS, 3FR 135CM

## (undated) DEVICE — GUIDEWIRE, HI-TORQUE, VERSACORE, 145CM, FLOPPY

## (undated) DEVICE — CATHETER, TELESCOPE, GUDE EXTENSION, 6 FR

## (undated) DEVICE — CATHETER, THERMODILUTION, SWAN GANZ, VIP, 5 LUMEN, 7.5 FR, 110 CM

## (undated) DEVICE — CATHETER, EAGLE EYE, PLATNIUM (IVUS)

## (undated) DEVICE — GUIDEWIRE, RUN THROUGH WIRE, 180CM

## (undated) DEVICE — CATHETER, BALLOON, NC EUPHORA NONCOMPLIANT 2.0 X 20 X 142CM

## (undated) DEVICE — MANIFOLD KIT, CUSTOM, GEAUGA

## (undated) DEVICE — CATHETER, BALLOON DILATION, EUPHORA SEMICOMPLIANT 2.0  X 15 MM X 142CM

## (undated) DEVICE — GUIDEWIRE, INQUIRE, J TIP, .035 X 210CM, FIXED CORE, DIAGNOSTIC

## (undated) DEVICE — 8.5FR X 12CM FAST-CATH HEMOSTASISINTRODUCER, CATH-LOCKINTRO SHEATH WITH HEMO VALVE AND SIDEPORT, DILATOR, AND 50CM DOUBLE DISTAL GUIDEWIRE WITH J AND STRAIGHT ENDS, .038IN MAX GWIRE

## (undated) DEVICE — CATHETER, GUIDING, LAUNCHER, 6FR AL 75

## (undated) DEVICE — CATHETER, OPTITORQUE, 5FR, TIG1, 1H/100CM

## (undated) DEVICE — SHEATH, GLIDESHEATH, SLENDER, 6FR 16CM

## (undated) DEVICE — INFLATION DEVICE, BASIXCOMPAX, 30 ATM/BAR, 20ML  MAP152

## (undated) DEVICE — GUIDE WIRE, 035/300CM, HI-TORGUE, SUPRA CORE

## (undated) DEVICE — BAND, VASCULAR, RADIAL HEMOSTAT, LONG 27CM

## (undated) DEVICE — CATHETER, BALLOON, TAKERU RX PTCA, 1.5MM X 12MM

## (undated) DEVICE — MICROINTRODUCER KIT, VSI, 5FR X 40CM, REGULAR

## (undated) DEVICE — CATHETER, GUIDING, LAUNCHER, 6FR EBU 3.0